# Patient Record
Sex: FEMALE | Race: WHITE
[De-identification: names, ages, dates, MRNs, and addresses within clinical notes are randomized per-mention and may not be internally consistent; named-entity substitution may affect disease eponyms.]

---

## 2020-09-19 ENCOUNTER — HOSPITAL ENCOUNTER (EMERGENCY)
Dept: HOSPITAL 11 - JP.ED | Age: 50
Discharge: HOME | End: 2020-09-19
Payer: COMMERCIAL
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
## 2020-09-19 DIAGNOSIS — S00.83XA: ICD-10-CM

## 2020-09-19 DIAGNOSIS — S43.101A: ICD-10-CM

## 2020-09-19 DIAGNOSIS — S22.41XA: Primary | ICD-10-CM

## 2020-09-19 PROCEDURE — 73000 X-RAY EXAM OF COLLAR BONE: CPT

## 2020-09-19 PROCEDURE — 99283 EMERGENCY DEPT VISIT LOW MDM: CPT

## 2020-09-19 PROCEDURE — 71046 X-RAY EXAM CHEST 2 VIEWS: CPT
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
## 2020-09-21 NOTE — CR
CHEST: 2 view

 

CLINICAL HISTORY:Fall

 

COMPARISON:None

 

FINDINGS:  The heart is enlarged. Pulmonary vascular is normal. No infiltrate

effusion or pneumothorax is seen. There is separation at the right AC joint

 

Impression: No acute pulmonary process

 

Right before meals separation

## 2020-09-21 NOTE — CR
Clavicle Rt

 

CLINICAL HISTORY: Injury

 

FINDINGS: There is a separation at the right AC joint of approximately 8 mm.

There is a fracture of the right second rib which is likely acute.Glenohumeral

joint appears intact.

 

IMPRESSION: AC joint separation

 

Nondisplaced fracture of the right second rib posteriorly